# Patient Record
Sex: FEMALE | Race: BLACK OR AFRICAN AMERICAN | Employment: FULL TIME | URBAN - METROPOLITAN AREA
[De-identification: names, ages, dates, MRNs, and addresses within clinical notes are randomized per-mention and may not be internally consistent; named-entity substitution may affect disease eponyms.]

---

## 2018-01-22 ENCOUNTER — APPOINTMENT (OUTPATIENT)
Dept: GENERAL RADIOLOGY | Age: 38
End: 2018-01-22
Attending: EMERGENCY MEDICINE
Payer: MEDICARE

## 2018-01-22 ENCOUNTER — HOSPITAL ENCOUNTER (EMERGENCY)
Age: 38
Discharge: NURSING, REHAB OR LONG-TERM CARE | End: 2018-01-22
Attending: EMERGENCY MEDICINE
Payer: MEDICARE

## 2018-01-22 VITALS
DIASTOLIC BLOOD PRESSURE: 85 MMHG | TEMPERATURE: 98.9 F | RESPIRATION RATE: 16 BRPM | SYSTOLIC BLOOD PRESSURE: 130 MMHG | HEIGHT: 64 IN | OXYGEN SATURATION: 99 % | BODY MASS INDEX: 22.2 KG/M2 | WEIGHT: 130 LBS | HEART RATE: 85 BPM

## 2018-01-22 DIAGNOSIS — R07.9 CHEST PAIN, UNSPECIFIED TYPE: Primary | ICD-10-CM

## 2018-01-22 LAB
ALBUMIN SERPL-MCNC: 3.4 G/DL (ref 3.4–5)
ALBUMIN/GLOB SERPL: 0.6 {RATIO} (ref 0.8–1.7)
ALP SERPL-CCNC: 76 U/L (ref 45–117)
ALT SERPL-CCNC: 10 U/L (ref 13–56)
ANION GAP SERPL CALC-SCNC: 5 MMOL/L (ref 3–18)
AST SERPL-CCNC: 11 U/L (ref 15–37)
BASOPHILS # BLD: 0 K/UL (ref 0–0.06)
BASOPHILS NFR BLD: 0 % (ref 0–2)
BILIRUB SERPL-MCNC: 0.4 MG/DL (ref 0.2–1)
BUN SERPL-MCNC: 11 MG/DL (ref 7–18)
BUN/CREAT SERPL: 13 (ref 12–20)
CALCIUM SERPL-MCNC: 8.9 MG/DL (ref 8.5–10.1)
CHLORIDE SERPL-SCNC: 105 MMOL/L (ref 100–108)
CK MB CFR SERPL CALC: NORMAL % (ref 0–4)
CK MB SERPL-MCNC: <1 NG/ML (ref 5–25)
CK SERPL-CCNC: 46 U/L (ref 26–192)
CO2 SERPL-SCNC: 30 MMOL/L (ref 21–32)
CREAT SERPL-MCNC: 0.82 MG/DL (ref 0.6–1.3)
D DIMER PPP FEU-MCNC: 0.42 UG/ML(FEU)
DIFFERENTIAL METHOD BLD: ABNORMAL
EOSINOPHIL # BLD: 0.1 K/UL (ref 0–0.4)
EOSINOPHIL NFR BLD: 1 % (ref 0–5)
ERYTHROCYTE [DISTWIDTH] IN BLOOD BY AUTOMATED COUNT: 12.4 % (ref 11.6–14.5)
GLOBULIN SER CALC-MCNC: 6 G/DL (ref 2–4)
GLUCOSE SERPL-MCNC: 84 MG/DL (ref 74–99)
HCT VFR BLD AUTO: 35 % (ref 35–45)
HGB BLD-MCNC: 12.1 G/DL (ref 12–16)
LYMPHOCYTES # BLD: 0.8 K/UL (ref 0.9–3.6)
LYMPHOCYTES NFR BLD: 15 % (ref 21–52)
MCH RBC QN AUTO: 29.2 PG (ref 24–34)
MCHC RBC AUTO-ENTMCNC: 34.6 G/DL (ref 31–37)
MCV RBC AUTO: 84.5 FL (ref 74–97)
MONOCYTES # BLD: 0.3 K/UL (ref 0.05–1.2)
MONOCYTES NFR BLD: 5 % (ref 3–10)
NEUTS SEG # BLD: 4 K/UL (ref 1.8–8)
NEUTS SEG NFR BLD: 79 % (ref 40–73)
PLATELET # BLD AUTO: 166 K/UL (ref 135–420)
PMV BLD AUTO: 9.4 FL (ref 9.2–11.8)
POTASSIUM SERPL-SCNC: 3.7 MMOL/L (ref 3.5–5.5)
PROT SERPL-MCNC: 9.4 G/DL (ref 6.4–8.2)
RBC # BLD AUTO: 4.14 M/UL (ref 4.2–5.3)
SODIUM SERPL-SCNC: 140 MMOL/L (ref 136–145)
TROPONIN I BLD-MCNC: <0.04 NG/ML (ref 0–0.08)
TROPONIN I SERPL-MCNC: <0.02 NG/ML (ref 0–0.04)
WBC # BLD AUTO: 5.1 K/UL (ref 4.6–13.2)

## 2018-01-22 PROCEDURE — 71045 X-RAY EXAM CHEST 1 VIEW: CPT

## 2018-01-22 PROCEDURE — 99285 EMERGENCY DEPT VISIT HI MDM: CPT

## 2018-01-22 PROCEDURE — 96374 THER/PROPH/DIAG INJ IV PUSH: CPT

## 2018-01-22 PROCEDURE — 93005 ELECTROCARDIOGRAM TRACING: CPT

## 2018-01-22 PROCEDURE — 84484 ASSAY OF TROPONIN QUANT: CPT | Performed by: EMERGENCY MEDICINE

## 2018-01-22 PROCEDURE — 74011250636 HC RX REV CODE- 250/636: Performed by: EMERGENCY MEDICINE

## 2018-01-22 PROCEDURE — 74011000250 HC RX REV CODE- 250: Performed by: EMERGENCY MEDICINE

## 2018-01-22 PROCEDURE — 85025 COMPLETE CBC W/AUTO DIFF WBC: CPT | Performed by: EMERGENCY MEDICINE

## 2018-01-22 PROCEDURE — 74011250637 HC RX REV CODE- 250/637: Performed by: EMERGENCY MEDICINE

## 2018-01-22 PROCEDURE — 80053 COMPREHEN METABOLIC PANEL: CPT | Performed by: EMERGENCY MEDICINE

## 2018-01-22 PROCEDURE — 82550 ASSAY OF CK (CPK): CPT | Performed by: EMERGENCY MEDICINE

## 2018-01-22 PROCEDURE — 85379 FIBRIN DEGRADATION QUANT: CPT | Performed by: EMERGENCY MEDICINE

## 2018-01-22 RX ORDER — KETOROLAC TROMETHAMINE 30 MG/ML
30 INJECTION, SOLUTION INTRAMUSCULAR; INTRAVENOUS
Status: COMPLETED | OUTPATIENT
Start: 2018-01-22 | End: 2018-01-22

## 2018-01-22 RX ADMIN — KETOROLAC TROMETHAMINE 30 MG: 30 INJECTION, SOLUTION INTRAMUSCULAR at 18:16

## 2018-01-22 RX ADMIN — LIDOCAINE HYDROCHLORIDE 50 ML: 20 SOLUTION ORAL; TOPICAL at 18:16

## 2018-01-22 NOTE — ED PROVIDER NOTES
EMERGENCY DEPARTMENT HISTORY AND PHYSICAL EXAM    6:12 PM      Date: 1/22/2018  Patient Name: Rhonda Wynn    History of Presenting Illness     Chief Complaint   Patient presents with    Chest Pain    Shortness of Breath         History Provided By: Patient    Chief Complaint: Chest pain and shortness of breath  Duration: 9 Hours  Timing:  Constant  Location: Mid chest  Severity: 8 out of 10  Associated Symptoms: SOB and nausea. Patient denies cough, fever, chills, hx of blood clot in legs, hx of blood clot in lungs, chest pain upon palpation, and any other associated symptoms or complaints. Additional History (Context): Rhonda Wynn is a 40 y.o. female with a history of HTN, non-compliance with medication regimen, psoriasis, and bronchitis who presents with c/o mid chest pain onset 9 hours ago. Patient describes her pain as constant, located in the mid chest, and is rated 8/10 in severity. Denies any current shortness of breath. She reports that she woke up with this chest pain. She denies exacerbation of chest pain with palpation. She states that this has never happened to her before. Per EMS, patient was given 324 aspirin and 1 nitro PTA with slight improvement of pain. Patient states that she has a history of HIV and does not know her last count. Patient denies cough, fever, chills, hx of blood clot in legs, hx of blood clot in lungs, chest pain upon palpation, exacerbation of chest pain with deep breaths, and any other associated symptoms or complaints. PCP: Babatunde Luna MD    Current Outpatient Prescriptions   Medication Sig Dispense Refill    NIFEDIPINE PO Take  by mouth.  HYDROcodone-acetaminophen (NORCO) 5-325 mg per tablet Take 1 Tab by mouth every four (4) hours as needed for Pain. 12 Tab 0    cyclobenzaprine (FLEXERIL) 10 mg tablet Take 1 tablet by mouth three (3) times daily as needed for Muscle Spasm(s).  20 tablet 0       Past History     Past Medical History:  Past Medical History:   Diagnosis Date    Bronchitis     Hypertension     Non compliance w medication regimen     Psoriasis        Past Surgical History:  Past Surgical History:   Procedure Laterality Date    HX  SECTION      HX HYSTERECTOMY         Family History:  No family history on file. Social History:  Social History   Substance Use Topics    Smoking status: Current Every Day Smoker     Packs/day: 0.25    Smokeless tobacco: Not on file    Alcohol use Yes      Comment: rare       Allergies:  No Known Allergies      Review of Systems       Review of Systems   Constitutional: Negative for chills and fever. HENT: Negative for rhinorrhea. Respiratory: Positive for shortness of breath. Cardiovascular: Positive for chest pain. Gastrointestinal: Positive for nausea. Negative for abdominal pain and vomiting. Endocrine: Negative for polyuria. Genitourinary: Negative for dysuria. Musculoskeletal: Negative for back pain. Skin: Negative for rash. Neurological: Negative for headaches. All other systems reviewed and are negative. Physical Exam     Patient Vitals for the past 12 hrs:   Temp Pulse Resp BP SpO2   18 1739 99.4 °F (37.4 °C) 88 16 (!) 125/92 100 %         Physical Exam   Constitutional: She is oriented to person, place, and time. She appears well-developed and well-nourished. Speaking in full sentences   HENT:   Head: Normocephalic and atraumatic. Eyes: Conjunctivae are normal. Pupils are equal, round, and reactive to light. Neck: Normal range of motion. Neck supple. Cardiovascular: Normal rate and regular rhythm. Pulmonary/Chest: Effort normal and breath sounds normal. No respiratory distress. She has no wheezes. She exhibits no tenderness. No chest wall tenderness. Abdominal: Soft. Bowel sounds are normal. She exhibits no distension. There is no tenderness. There is no rebound and no guarding. Musculoskeletal: Normal range of motion.    Neurological: She is alert and oriented to person, place, and time. Left arm contracture at baseline. Skin: Skin is warm and dry. Psychiatric: She has a normal mood and affect. Thought content normal.   Nursing note and vitals reviewed. Diagnostic Study Results     Labs -  Recent Results (from the past 12 hour(s))   EKG, 12 LEAD, INITIAL    Collection Time: 01/22/18  5:17 PM   Result Value Ref Range    Ventricular Rate 95 BPM    Atrial Rate 95 BPM    P-R Interval 138 ms    QRS Duration 70 ms    Q-T Interval 348 ms    QTC Calculation (Bezet) 437 ms    Calculated P Axis 17 degrees    Calculated R Axis 38 degrees    Calculated T Axis 39 degrees    Diagnosis       Normal sinus rhythm  Normal ECG  When compared with ECG of 29-OCT-2014 12:12,  No significant change was found     CBC WITH AUTOMATED DIFF    Collection Time: 01/22/18  6:05 PM   Result Value Ref Range    WBC 5.1 4.6 - 13.2 K/uL    RBC 4.14 (L) 4.20 - 5.30 M/uL    HGB 12.1 12.0 - 16.0 g/dL    HCT 35.0 35.0 - 45.0 %    MCV 84.5 74.0 - 97.0 FL    MCH 29.2 24.0 - 34.0 PG    MCHC 34.6 31.0 - 37.0 g/dL    RDW 12.4 11.6 - 14.5 %    PLATELET 014 369 - 212 K/uL    MPV 9.4 9.2 - 11.8 FL    NEUTROPHILS 79 (H) 40 - 73 %    LYMPHOCYTES 15 (L) 21 - 52 %    MONOCYTES 5 3 - 10 %    EOSINOPHILS 1 0 - 5 %    BASOPHILS 0 0 - 2 %    ABS. NEUTROPHILS 4.0 1.8 - 8.0 K/UL    ABS. LYMPHOCYTES 0.8 (L) 0.9 - 3.6 K/UL    ABS. MONOCYTES 0.3 0.05 - 1.2 K/UL    ABS. EOSINOPHILS 0.1 0.0 - 0.4 K/UL    ABS.  BASOPHILS 0.0 0.0 - 0.06 K/UL    DF AUTOMATED     METABOLIC PANEL, COMPREHENSIVE    Collection Time: 01/22/18  6:05 PM   Result Value Ref Range    Sodium 140 136 - 145 mmol/L    Potassium 3.7 3.5 - 5.5 mmol/L    Chloride 105 100 - 108 mmol/L    CO2 30 21 - 32 mmol/L    Anion gap 5 3.0 - 18 mmol/L    Glucose 84 74 - 99 mg/dL    BUN 11 7.0 - 18 MG/DL    Creatinine 0.82 0.6 - 1.3 MG/DL    BUN/Creatinine ratio 13 12 - 20      GFR est AA >60 >60 ml/min/1.73m2    GFR est non-AA >60 >60 ml/min/1.73m2 Calcium 8.9 8.5 - 10.1 MG/DL    Bilirubin, total 0.4 0.2 - 1.0 MG/DL    ALT (SGPT) 10 (L) 13 - 56 U/L    AST (SGOT) 11 (L) 15 - 37 U/L    Alk. phosphatase 76 45 - 117 U/L    Protein, total 9.4 (H) 6.4 - 8.2 g/dL    Albumin 3.4 3.4 - 5.0 g/dL    Globulin 6.0 (H) 2.0 - 4.0 g/dL    A-G Ratio 0.6 (L) 0.8 - 1.7     CARDIAC PANEL,(CK, CKMB & TROPONIN)    Collection Time: 01/22/18  6:05 PM   Result Value Ref Range    CK 46 26 - 192 U/L    CK - MB <1.0 <3.6 ng/ml    CK-MB Index  0.0 - 4.0 %     CALCULATION NOT PERFORMED WHEN RESULT IS BELOW LINEAR LIMIT    Troponin-I, Qt. <0.02 0.0 - 0.045 NG/ML   D DIMER    Collection Time: 01/22/18  6:05 PM   Result Value Ref Range    D DIMER 0.42 <0.46 ug/ml(FEU)   POC TROPONIN-I    Collection Time: 01/22/18  6:43 PM   Result Value Ref Range    Troponin-I (POC) <0.04 0.00 - 0.08 ng/mL       Radiologic Studies -   No results found. Medical Decision Making   I am the first provider for this patient. I reviewed the vital signs, available nursing notes, past medical history, past surgical history, family history and social history. Vital Signs-Reviewed the patient's vital signs. Pulse Oximetry Analysis -  100% on room air (normal)    Cardiac Monitor:  Rate/rhythm:    EKG: Interpreted by the EP. Time Interpreted: 17:17   Rate: 95   Rhythm: normal sinus rhythm   Interpretation: Q wave inversion in lead 3   Comparison: Unchanged from prior    Records Reviewed: Nursing Notes (Time of Review: 6:12 PM)    Provider Notes (Medical Decision Making): Patinet with constant chest pain since this morning. Has normal troponin x2. EKG unchanged. CXR reassuring. Already given aspirin and nitro. ED Course: Progress Notes, Reevaluation, and Consults:      Diagnosis     Clinical Impression:   1.  Chest pain, unspecified type        Disposition: Discharge    Follow-up Information     Follow up With Details Comments Contact Info    Gisel Grijalva MD In 1 day  Shan Ocampo [address] [address]  [phone]             Patient's Medications   Start Taking    No medications on file   Continue Taking    CYCLOBENZAPRINE (FLEXERIL) 10 MG TABLET    Take 1 tablet by mouth three (3) times daily as needed for Muscle Spasm(s). HYDROCODONE-ACETAMINOPHEN (NORCO) 5-325 MG PER TABLET    Take 1 Tab by mouth every four (4) hours as needed for Pain. NIFEDIPINE PO    Take  by mouth. These Medications have changed    No medications on file   Stop Taking    No medications on file     _______________________________    Attestations:  [address] acting as a scribe for and in the presence of Nav Kelly MD      January 22, 2018 at [facility] NO 5 PM       Provider Attestation:      I personally performed the services described in the documentation, reviewed the documentation, as recorded by the scribe in my presence, and it accurately and completely records my words and actions.  January 22, 2018 at 6:12 PM - Nav Kelly MD    _______________________________

## 2018-01-23 LAB
ATRIAL RATE: 95 BPM
CALCULATED P AXIS, ECG09: 17 DEGREES
CALCULATED R AXIS, ECG10: 38 DEGREES
CALCULATED T AXIS, ECG11: 39 DEGREES
DIAGNOSIS, 93000: NORMAL
P-R INTERVAL, ECG05: 138 MS
Q-T INTERVAL, ECG07: 348 MS
QRS DURATION, ECG06: 70 MS
QTC CALCULATION (BEZET), ECG08: 437 MS
VENTRICULAR RATE, ECG03: 95 BPM

## 2018-01-23 NOTE — ED NOTES
[facility] made aware of patient's discharge from [facility]. Expected arrival of 30 minutes.

## 2018-01-23 NOTE — DISCHARGE INSTRUCTIONS
Your blood tests, x-ray, and EKG were reassuring. If you have any worsening symptoms, please return. Chest Pain: Care Instructions  Your Care Instructions    There are many things that can cause chest pain. Some are not serious and will get better on their own in a few days. But some kinds of chest pain need more testing and treatment. Your doctor may have recommended a follow-up visit in the next 8 to 12 hours. If you are not getting better, you may need more tests or treatment. Even though your doctor has released you, you still need to watch for any problems. The doctor carefully checked you, but sometimes problems can develop later. If you have new symptoms or if your symptoms do not get better, get medical care right away. If you have worse or different chest pain or pressure that lasts more than 5 minutes or you passed out (lost consciousness), call 911 or seek other emergency help right away. A medical visit is only one step in your treatment. Even if you feel better, you still need to do what your doctor recommends, such as going to all suggested follow-up appointments and taking medicines exactly as directed. This will help you recover and help prevent future problems. How can you care for yourself at home? · Rest until you feel better. · Take your medicine exactly as prescribed. Call your doctor if you think you are having a problem with your medicine. · Do not drive after taking a prescription pain medicine. When should you call for help? Call 911 if:  ? · You passed out (lost consciousness). ? · You have severe difficulty breathing. ? · You have symptoms of a heart attack. These may include:  ¨ Chest pain or pressure, or a strange feeling in your chest.  ¨ Sweating. ¨ Shortness of breath. ¨ Nausea or vomiting. ¨ Pain, pressure, or a strange feeling in your back, neck, jaw, or upper belly or in one or both shoulders or arms. ¨ Lightheadedness or sudden weakness.   ¨ A fast or irregular heartbeat. After you call 911, the  may tell you to chew 1 adult-strength or 2 to 4 low-dose aspirin. Wait for an ambulance. Do not try to drive yourself. ?Call your doctor today if:  ? · You have any trouble breathing. ? · Your chest pain gets worse. ? · You are dizzy or lightheaded, or you feel like you may faint. ? · You are not getting better as expected. ? · You are having new or different chest pain. Where can you learn more? Go to http://arturo-saúl.info/. Enter A120 in the search box to learn more about \"Chest Pain: Care Instructions. \"  Current as of: March 20, 2017  Content Version: 11.4  © 5001-8590 [employer]. Care instructions adapted under license by [employer] (which disclaims liability or warranty for this information). If you have questions about a medical condition or this instruction, always ask your healthcare professional. [place] [address] any warranty or liability for your use of this information.

## 2018-01-23 NOTE — ED NOTES
Patient is now leaving for [facility] via [employer]. No acute distress noted. Needs met.

## 2018-06-11 ENCOUNTER — APPOINTMENT (OUTPATIENT)
Dept: CT IMAGING | Age: 38
End: 2018-06-11
Attending: EMERGENCY MEDICINE
Payer: MEDICARE

## 2018-06-11 ENCOUNTER — HOSPITAL ENCOUNTER (EMERGENCY)
Age: 38
Discharge: HOME | End: 2018-06-11
Attending: EMERGENCY MEDICINE | Admitting: EMERGENCY MEDICINE
Payer: MEDICARE

## 2018-06-11 VITALS
OXYGEN SATURATION: 97 % | RESPIRATION RATE: 16 BRPM | SYSTOLIC BLOOD PRESSURE: 111 MMHG | HEART RATE: 98 BPM | DIASTOLIC BLOOD PRESSURE: 79 MMHG | TEMPERATURE: 98.1 F
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
## 2018-06-11 DIAGNOSIS — Z91.14 NONCOMPLIANCE WITH MEDICATION REGIMEN: ICD-10-CM

## 2018-06-11 PROCEDURE — 99284 EMERGENCY DEPT VISIT MOD MDM: CPT

## 2018-06-11 PROCEDURE — 72125 CT NECK SPINE W/O DYE: CPT

## 2018-06-11 PROCEDURE — 70450 CT HEAD/BRAIN W/O DYE: CPT

## 2018-06-11 NOTE — ED TRIAGE NOTES
Patient brought in from [facility], she is alert and orientedx4. Patient reports she fell yesterday but is not in pain and does not know why they sent her here. Per Transport patient has been refusing medications at the nursing home. Patient reported she is not eating as well.

## 2018-06-11 NOTE — ED NOTES
Patient refused multiple attempts to draw blood as requested from PCP. Doctor Irving Seals made aware. Patient remains alert and oriented. Needs met. Will return to [facility].

## 2018-06-11 NOTE — ED TRIAGE NOTES
Per EMS-\"Patient has been falling today. Patient has a history of HIV and CVA. She has a history of left side deficit and slurred speech. \"

## 2018-06-11 NOTE — ED PROVIDER NOTES
EMERGENCY DEPARTMENT HISTORY AND PHYSICAL EXAM    12:04 PM      Date: 6/11/2018  Patient Name: Smiley Alfaro    History of Presenting Illness     Chief Complaint   Patient presents with    Fall         History Provided By: Patient and EMS    Chief Complaint: Fall  Duration:  Last night  Timing:  Acute  Location: Generalized  Quality: Not described  Severity: N/A  Modifying Factors: No modifying or aggravating factors were reported. Associated Symptoms: denies any other associated signs or symptoms      Additional History (Context): Smiley Alfaro is a 40 y.o. female with PMHx of CVA, HTN and HIV who presents to the ED with c/o acute episode of fall last night. Patient has been a resident at [facility] for the past 2 months after history of CVA last year at [facility]. Patient reports she sustained a mechanical fall out of bed while attempting to reach for her call bell, notes hitting her head, denies LOC. States she was told by staff that she sustained another fall this morning, does not recall incident. Patient admits she had a 7/10 HA earlier in the morning for which she was given Tylenol at [facility], admits her HA is currently resolved. Denies chest pain, abdominal pain, nausea, vomiting, back pain or SI. States she has been NPO, \"I'm just not hungry or thirsty. \" Per EMS, patient was refusing her daily medications, participating in ADL's and was sent to the ED for a \"full workup. \" No modifying or aggravating factors were reported. No other symptoms or concerns were expressed. PCP: All Small MD    Current Outpatient Prescriptions   Medication Sig Dispense Refill    NIFEDIPINE PO Take  by mouth.  HYDROcodone-acetaminophen (NORCO) 5-325 mg per tablet Take 1 Tab by mouth every four (4) hours as needed for Pain. 12 Tab 0    cyclobenzaprine (FLEXERIL) 10 mg tablet Take 1 tablet by mouth three (3) times daily as needed for Muscle Spasm(s).  20 tablet 0       Past History     Past Medical History:  Past Medical History:   Diagnosis Date    Bronchitis     Hypertension     Infectious disease     Non compliance w medication regimen     Psoriasis        Past Surgical History:  Past Surgical History:   Procedure Laterality Date    HX  SECTION      HX HYSTERECTOMY         Family History:  History reviewed. No pertinent family history. Social History:  Social History   Substance Use Topics    Smoking status: Current Every Day Smoker     Packs/day: 0.25    Smokeless tobacco: None    Alcohol use Yes      Comment: rare       Allergies:  No Known Allergies      Review of Systems       Review of Systems   Constitutional: Negative for chills. HENT: Negative for congestion and sore throat. Respiratory: Negative for cough and shortness of breath. Cardiovascular: Negative for chest pain. Gastrointestinal: Negative for abdominal pain, diarrhea, nausea and vomiting. Genitourinary: Negative for dysuria. Musculoskeletal: Negative for back pain. Skin: Negative for rash. Neurological: Negative for dizziness and headaches. All other systems reviewed and are negative. Physical Exam     Visit Vitals    /81    Pulse 98    Temp 98.1 °F (36.7 °C)    Resp 18         Physical Exam  General Exam: Chronically ill appearing. No acute distress. Eye Exam: Lids and conjunctiva are normal  ENT Exam: The general head and facial exam is normal.  The neck is supple without meningeal signs. No significant adenopathy. Dry oral mucosa. Pulmonary Exam: No respiratory distress. The respiratory rate is normal.  No stridor. The breath sounds are equal bilaterally. There are no wheezes, rales, or rhonchi noted. Cardiac Exam: The cardiac rate and rhythm are normal.  No significant murmurs, rubs, or gallops. The peripheral pulses are normal.  Abdominal Exam: Abdomen is soft and non-distended. No pulsatile masses. There is no local tenderness. There is no rebound or guarding noted.   Skin and Soft Tissue: The skin is warm and dry, without significant abnormality. Good color. Musculoskeletal Exam: There is no clubbing or cyanosis. There is no peripheral edema. The musculoskeletal exam of the lower extremities is normal without significant local tenderness or spasm. Neurologic: Alert and oriented to baseline. LUE in contracture (chronic). No focal deficits to BLE or RUE. Psychiatric: Oriented to baseline, normal interactions. Refusing blood draw, denies SI or HI. Diagnostic Study Results     Radiologic Studies -   CT HEAD WO CONT   Final Result      CT SPINE CERV WO CONT   Final Result      CT Head:  1. No acute findings. 2.  Chronic right anterior lenticulostriate infarct. CT C-Spine:  1. No acute findings. 2.  Chronic right anterior lenticulostriate infarct. Medical Decision Making   I am the first provider for this patient. I reviewed the vital signs, available nursing notes, past medical history, past surgical history, family history and social history. Vital Signs-Reviewed the patient's vital signs. Cardiac Monitor:  Rate: 98  Rhythm:  Normal Sinus Rhythm     Records Reviewed: Nursing Notes and Old Medical Records (Time of Review: 12:04 PM)    ED Course: Progress Notes, Reevaluation, and Consults:  2:03 PM: Per radiologist, patient CT Head and CT C-spine negative. 2:13 PM; Patient again offered testing of blood work by myself and her nurse. Patient continues to state she does not want this. Patient aware there could be abnormalities that we could help treat today. Aware if she changes her mind, she can return to the ED for blood testing. Provider Notes (Medical Decision Making): Pt with h/o HIV and CVA residing in NH. At baseline per transport service and NH. Pt answering questions appropriately and able to state when she had her stroke and where she was treated. H/o med non compliance. Agrees to CT head and neck to eval for injury after falls.  Declining blood draw for labs. Not able to force pt to have blood work as she demonstrates reasonable judgement and appropriate answers to questions and aware of risks of refusing blood work. Pt appears at baseline and to return ton NH. Diagnosis     Clinical Impression:   1. Fall at nursing home, initial encounter    2. Noncompliance with medication regimen        Disposition: Discharge. Follow-up Information     Follow up With Details Comments Contact Info    Abad Majano MD In 2 days For Emergency Department Follow-Up ePr [address] [facility]      [facility] EMERGENCY DEPT  As needed, If symptoms worsen or if you change your mind about having blood work done. [address]  [phone]           Patient's Medications   Start Taking    No medications on file   Continue Taking    CYCLOBENZAPRINE (FLEXERIL) 10 MG TABLET    Take 1 tablet by mouth three (3) times daily as needed for Muscle Spasm(s). HYDROCODONE-ACETAMINOPHEN (NORCO) 5-325 MG PER TABLET    Take 1 Tab by mouth every four (4) hours as needed for Pain. NIFEDIPINE PO    Take  by mouth. These Medications have changed    No medications on file   Stop Taking    No medications on file     _______________________________    Attestations:  [address] acting as a scribe for and in the presence of Aminta Childress MD      June 11, 2018 at 12:04 PM       Provider Attestation:      I personally performed the services described in the documentation, reviewed the documentation, as recorded by the scribe in my presence, and it accurately and completely records my words and actions.  June 11, 2018 at 12:04 PM - Aminta Childress MD    _______________________________

## 2018-07-25 ENCOUNTER — APPOINTMENT (OUTPATIENT)
Dept: ULTRASOUND IMAGING | Age: 38
End: 2018-07-25
Attending: EMERGENCY MEDICINE
Payer: OTHER MISCELLANEOUS

## 2018-07-25 ENCOUNTER — HOSPITAL ENCOUNTER (EMERGENCY)
Age: 38
Discharge: OTHER INSTITUTION | End: 2018-07-25
Attending: EMERGENCY MEDICINE
Payer: OTHER MISCELLANEOUS

## 2018-07-25 VITALS
BODY MASS INDEX: 14.93 KG/M2 | SYSTOLIC BLOOD PRESSURE: 125 MMHG | OXYGEN SATURATION: 100 % | WEIGHT: 87 LBS | TEMPERATURE: 98.4 F | DIASTOLIC BLOOD PRESSURE: 86 MMHG | HEART RATE: 104 BPM | RESPIRATION RATE: 18 BRPM

## 2018-07-25 DIAGNOSIS — E86.0 DEHYDRATION: Primary | ICD-10-CM

## 2018-07-25 DIAGNOSIS — N30.00 ACUTE CYSTITIS WITHOUT HEMATURIA: ICD-10-CM

## 2018-07-25 LAB
ALBUMIN SERPL-MCNC: 3.1 G/DL (ref 3.4–5)
ALBUMIN/GLOB SERPL: 0.5 {RATIO} (ref 0.8–1.7)
ALP SERPL-CCNC: 93 U/L (ref 45–117)
ALT SERPL-CCNC: 66 U/L (ref 13–56)
AMPHET UR QL SCN: NEGATIVE
ANION GAP SERPL CALC-SCNC: 9 MMOL/L (ref 3–18)
APPEARANCE UR: ABNORMAL
AST SERPL-CCNC: 80 U/L (ref 15–37)
ATRIAL RATE: 117 BPM
BACTERIA URNS QL MICRO: ABNORMAL /HPF
BARBITURATES UR QL SCN: NEGATIVE
BASOPHILS # BLD: 0 K/UL (ref 0–0.1)
BASOPHILS NFR BLD: 0 % (ref 0–2)
BENZODIAZ UR QL: NEGATIVE
BILIRUB SERPL-MCNC: 3 MG/DL (ref 0.2–1)
BILIRUB UR QL: ABNORMAL
BUN SERPL-MCNC: 73 MG/DL (ref 7–18)
BUN/CREAT SERPL: 50 (ref 12–20)
CALCIUM SERPL-MCNC: 9.2 MG/DL (ref 8.5–10.1)
CALCULATED P AXIS, ECG09: 53 DEGREES
CALCULATED R AXIS, ECG10: 65 DEGREES
CALCULATED T AXIS, ECG11: -28 DEGREES
CANNABINOIDS UR QL SCN: NEGATIVE
CHLORIDE SERPL-SCNC: 115 MMOL/L (ref 100–108)
CK MB CFR SERPL CALC: 1 % (ref 0–4)
CK MB SERPL-MCNC: 1.6 NG/ML (ref 5–25)
CK SERPL-CCNC: 162 U/L (ref 26–192)
CO2 SERPL-SCNC: 27 MMOL/L (ref 21–32)
COCAINE UR QL SCN: NEGATIVE
COLOR UR: ABNORMAL
CREAT SERPL-MCNC: 1.46 MG/DL (ref 0.6–1.3)
DIAGNOSIS, 93000: NORMAL
DIFFERENTIAL METHOD BLD: ABNORMAL
EOSINOPHIL # BLD: 0 K/UL (ref 0–0.4)
EOSINOPHIL NFR BLD: 0 % (ref 0–5)
EPITH CASTS URNS QL MICRO: ABNORMAL /LPF (ref 0–5)
ERYTHROCYTE [DISTWIDTH] IN BLOOD BY AUTOMATED COUNT: 15.2 % (ref 11.6–14.5)
GLOBULIN SER CALC-MCNC: 6.2 G/DL (ref 2–4)
GLUCOSE SERPL-MCNC: 93 MG/DL (ref 74–99)
GLUCOSE UR STRIP.AUTO-MCNC: NEGATIVE MG/DL
GRAN CASTS URNS QL MICRO: ABNORMAL /LPF
HCT VFR BLD AUTO: 31.9 % (ref 35–45)
HDSCOM,HDSCOM: NORMAL
HGB BLD-MCNC: 10.1 G/DL (ref 12–16)
HGB UR QL STRIP: ABNORMAL
HYALINE CASTS URNS QL MICRO: ABNORMAL /LPF (ref 0–2)
KETONES UR QL STRIP.AUTO: ABNORMAL MG/DL
LACTATE BLD-SCNC: 1.4 MMOL/L (ref 0.4–2)
LEUKOCYTE ESTERASE UR QL STRIP.AUTO: ABNORMAL
LYMPHOCYTES # BLD: 0.7 K/UL (ref 0.9–3.6)
LYMPHOCYTES NFR BLD: 11 % (ref 21–52)
MAGNESIUM SERPL-MCNC: 2.8 MG/DL (ref 1.6–2.6)
MCH RBC QN AUTO: 28.9 PG (ref 24–34)
MCHC RBC AUTO-ENTMCNC: 31.7 G/DL (ref 31–37)
MCV RBC AUTO: 91.1 FL (ref 74–97)
METHADONE UR QL: NEGATIVE
MONOCYTES # BLD: 0.5 K/UL (ref 0.05–1.2)
MONOCYTES NFR BLD: 8 % (ref 3–10)
MUCOUS THREADS URNS QL MICRO: ABNORMAL /LPF
NEUTS SEG # BLD: 5.3 K/UL (ref 1.8–8)
NEUTS SEG NFR BLD: 81 % (ref 40–73)
NITRITE UR QL STRIP.AUTO: POSITIVE
OPIATES UR QL: NEGATIVE
P-R INTERVAL, ECG05: 124 MS
PCP UR QL: NEGATIVE
PH UR STRIP: 5 [PH] (ref 5–8)
PLATELET # BLD AUTO: 134 K/UL (ref 135–420)
PMV BLD AUTO: 10.3 FL (ref 9.2–11.8)
POTASSIUM SERPL-SCNC: 3.8 MMOL/L (ref 3.5–5.5)
PROT SERPL-MCNC: 9.3 G/DL (ref 6.4–8.2)
PROT UR STRIP-MCNC: 100 MG/DL
Q-T INTERVAL, ECG07: 312 MS
QRS DURATION, ECG06: 78 MS
QTC CALCULATION (BEZET), ECG08: 435 MS
RBC # BLD AUTO: 3.5 M/UL (ref 4.2–5.3)
RBC #/AREA URNS HPF: ABNORMAL /HPF (ref 0–5)
SODIUM SERPL-SCNC: 151 MMOL/L (ref 136–145)
SP GR UR REFRACTOMETRY: 1.02 (ref 1–1.03)
TROPONIN I SERPL-MCNC: 0.04 NG/ML (ref 0–0.04)
UROBILINOGEN UR QL STRIP.AUTO: 2 EU/DL (ref 0.2–1)
VENTRICULAR RATE, ECG03: 117 BPM
WBC # BLD AUTO: 6.5 K/UL (ref 4.6–13.2)
WBC URNS QL MICRO: ABNORMAL /HPF (ref 0–4)

## 2018-07-25 PROCEDURE — 74011250636 HC RX REV CODE- 250/636: Performed by: EMERGENCY MEDICINE

## 2018-07-25 PROCEDURE — 99285 EMERGENCY DEPT VISIT HI MDM: CPT

## 2018-07-25 PROCEDURE — 80053 COMPREHEN METABOLIC PANEL: CPT | Performed by: EMERGENCY MEDICINE

## 2018-07-25 PROCEDURE — 76705 ECHO EXAM OF ABDOMEN: CPT

## 2018-07-25 PROCEDURE — 85025 COMPLETE CBC W/AUTO DIFF WBC: CPT | Performed by: EMERGENCY MEDICINE

## 2018-07-25 PROCEDURE — 96361 HYDRATE IV INFUSION ADD-ON: CPT

## 2018-07-25 PROCEDURE — 81001 URINALYSIS AUTO W/SCOPE: CPT | Performed by: EMERGENCY MEDICINE

## 2018-07-25 PROCEDURE — 96365 THER/PROPH/DIAG IV INF INIT: CPT

## 2018-07-25 PROCEDURE — 83735 ASSAY OF MAGNESIUM: CPT | Performed by: EMERGENCY MEDICINE

## 2018-07-25 PROCEDURE — 87040 BLOOD CULTURE FOR BACTERIA: CPT | Performed by: EMERGENCY MEDICINE

## 2018-07-25 PROCEDURE — 82553 CREATINE MB FRACTION: CPT | Performed by: EMERGENCY MEDICINE

## 2018-07-25 PROCEDURE — 87086 URINE CULTURE/COLONY COUNT: CPT | Performed by: EMERGENCY MEDICINE

## 2018-07-25 PROCEDURE — 80307 DRUG TEST PRSMV CHEM ANLYZR: CPT | Performed by: EMERGENCY MEDICINE

## 2018-07-25 PROCEDURE — 83605 ASSAY OF LACTIC ACID: CPT

## 2018-07-25 PROCEDURE — 93005 ELECTROCARDIOGRAM TRACING: CPT

## 2018-07-25 RX ORDER — NYSTATIN 100000 U/G
CREAM TOPICAL
COMMUNITY

## 2018-07-25 RX ORDER — LEVOFLOXACIN 5 MG/ML
500 INJECTION, SOLUTION INTRAVENOUS
Status: COMPLETED | OUTPATIENT
Start: 2018-07-25 | End: 2018-07-25

## 2018-07-25 RX ORDER — LOSARTAN POTASSIUM 50 MG/1
50 TABLET ORAL DAILY
COMMUNITY

## 2018-07-25 RX ORDER — LEVOFLOXACIN 750 MG/1
750 TABLET ORAL DAILY
Qty: 5 TAB | Refills: 0 | Status: SHIPPED | OUTPATIENT
Start: 2018-07-25 | End: 2018-07-30

## 2018-07-25 RX ORDER — ACETAMINOPHEN 325 MG/1
650 TABLET ORAL
COMMUNITY

## 2018-07-25 RX ADMIN — LEVOFLOXACIN 500 MG: 5 INJECTION, SOLUTION INTRAVENOUS at 07:48

## 2018-07-25 RX ADMIN — SODIUM CHLORIDE 1000 ML: 900 INJECTION, SOLUTION INTRAVENOUS at 02:09

## 2018-07-25 RX ADMIN — SODIUM CHLORIDE 500 ML: 900 INJECTION, SOLUTION INTRAVENOUS at 10:58

## 2018-07-25 NOTE — ED NOTES
Assumed care of pt. VSS. Pt does not have iv access at this time. Dr Sam Sheldon aware.   Report from [address] [facility]

## 2018-07-25 NOTE — PROGRESS NOTES
attempted to complete the initial Spiritual Assessment of the patient in bed 8 of the emergency room and offer Pastoral Care support but found patient to be non-responsive at this time. . Patient does not have any Zoroastrian/cultural needs that will affect patients preferences in health care. Chaplains will continue to follow and will provide pastoral care on an as needed/requested basis.     [facility]  [phone]

## 2018-07-25 NOTE — ED NOTES
Pt able to communicate using R hand. Pt will squeeze hand to answer yes/no questions. Follows commands.  Unable to utilize left side

## 2018-07-25 NOTE — ED NOTES
6:10AM Signed over from Dr. Hiral Paulson to follow up on US result. Patient already admitted but they want the [address] result before assigning a bed assignment. 9:50 AM US has been resulted. There is sludge but no obvious sign of inflammation in gallbladder. Will discuss with hospitalist as they are awaiting that result before admitting/accepting the patient    10:32 AM I had called Dr. Avery Camara to accept the patient as US study was back w/o any pathology. According to Dr. Hiral Paulson, pt was admitted without bed placement as this test was pending. According to Dr. Avery Camara, there was  no endorsement or discussion regarding admitting or accepting the pt on his side. Will call hospice to get further direction for further care as pt is under their care. 10:52 AM Discussed with hospice, as per nurse Anthony, they are comfortable managing patient out patient in nursing home. No further intervention requested. Tachycardia has improved in ED. Have been given fluids and IV abx and rx for home for UTI. Will discharge. 11:56 AM Consult: I discussed care with Dr. Fuentes Douglas (Hospice). It was a standard discussion including patient history, chief complaint, available diagnostic results, and predicted treatment course. He states that the patient is comfort measure only and he is very comfortable taking care of the pt and not doing any aggressive measures. Made aware of abnormal labs. Nicole Peterson acting as a scribe for and in the presence of Pranay Duncan MD      July 25, 2018 at 10:04 AM       Provider Attestation:      I personally performed the services described in the documentation, reviewed the documentation, as recorded by the scribe in my presence, and it accurately and completely records my words and actions.  July 25, 2018 at 10:04 AM - Pranay Duncan MD

## 2018-07-25 NOTE — ED NOTES
Rodrigo London RN from [facility] called back stating she is unable to get in contact with any of pt next of kin. Pt now needs to be treated as a \"regular patient and will be removed\" from their service on hospice. MD aware.      Next of kin they have listed:  Whitley Arnold- Mother in law- [phone]  Ovi Pandey- [phone]

## 2018-07-25 NOTE — ED NOTES
Per Dr. Sharon Anand and Hermann Pena MD-no indication for pt admit. [facility] and [address] [place] [address] notified.  Pt given d/c packet and px for Levaquin (UTI)

## 2018-07-25 NOTE — DISCHARGE INSTRUCTIONS
Dehydration: Care Instructions  Your Care Instructions  Dehydration happens when your body loses too much fluid. This might happen when you do not drink enough water or you lose large amounts of fluids from your body because of diarrhea, vomiting, or sweating. Severe dehydration can be life-threatening. Water and minerals called electrolytes help put your body fluids back in balance. Learn the early signs of fluid loss, and drink more fluids to prevent dehydration. Follow-up care is a key part of your treatment and safety. Be sure to make and go to all appointments, and call your doctor if you are having problems. It's also a good idea to know your test results and keep a list of the medicines you take. How can you care for yourself at home? · To prevent dehydration, drink plenty of fluids, enough so that your urine is light yellow or clear like water. Choose water and other caffeine-free clear liquids until you feel better. If you have kidney, heart, or liver disease and have to limit fluids, talk with your doctor before you increase the amount of fluids you drink. · If you do not feel like eating or drinking, try taking small sips of water, sports drinks, or other rehydration drinks. · Get plenty of rest.  To prevent dehydration  · Add more fluids to your diet and daily routine, unless your doctor has told you not to. · During hot weather, drink more fluids. Drink even more fluids if you exercise a lot. Stay away from drinks with alcohol or caffeine. · Watch for the symptoms of dehydration. These include:  ¨ A dry, sticky mouth. ¨ Dark yellow urine, and not much of it. ¨ Dry and sunken eyes. ¨ Feeling very tired. · Learn what problems can lead to dehydration. These include:  ¨ Diarrhea, fever, and vomiting. ¨ Any illness with a fever, such as pneumonia or the flu. ¨ Activities that cause heavy sweating, such as endurance races and heavy outdoor work in hot or humid weather.   ¨ Alcohol or drug abuse or withdrawal.  ¨ Certain medicines, such as cold and allergy pills (antihistamines), diet pills (diuretics), and laxatives. ¨ Certain diseases, such as diabetes, cancer, and heart or kidney disease. When should you call for help? Call 911 anytime you think you may need emergency care. For example, call if:    · You passed out (lost consciousness).    Call your doctor now or seek immediate medical care if:    · You are confused and cannot think clearly.     · You are dizzy or lightheaded, or you feel like you may faint.     · You have signs of needing more fluids. You have sunken eyes and a dry mouth, and you pass only a little dark urine.     · You cannot keep fluids down.    Watch closely for changes in your health, and be sure to contact your doctor if:    · You are not making tears.     · Your skin is very dry and sags slowly back into place after you pinch it.     · Your mouth and eyes are very dry. Where can you learn more? Go to http://arturo-saúl.info/. Enter O570 in the search box to learn more about \"Dehydration: Care Instructions. \"  Current as of: November 20, 2017  Content Version: 11.7  © 9839-4803 [employer]. Care instructions adapted under license by [employer] (which disclaims liability or warranty for this information). If you have questions about a medical condition or this instruction, always ask your healthcare professional. [place] [address] any warranty or liability for your use of this information. Urinary Tract Infection in Women: Care Instructions  Your Care Instructions    A urinary tract infection, or UTI, is a general term for an infection anywhere between the kidneys and the urethra (where urine comes out). Most UTIs are bladder infections. They often cause pain or burning when you urinate. UTIs are caused by bacteria and can be cured with antibiotics.  Be sure to complete your treatment so that the infection goes away. Follow-up care is a key part of your treatment and safety. Be sure to make and go to all appointments, and call your doctor if you are having problems. It's also a good idea to know your test results and keep a list of the medicines you take. How can you care for yourself at home? · Take your antibiotics as directed. Do not stop taking them just because you feel better. You need to take the full course of antibiotics. · Drink extra water and other fluids for the next day or two. This may help wash out the bacteria that are causing the infection. (If you have kidney, heart, or liver disease and have to limit fluids, talk with your doctor before you increase your fluid intake.)  · Avoid drinks that are carbonated or have caffeine. They can irritate the bladder. · Urinate often. Try to empty your bladder each time. · To relieve pain, take a hot bath or lay a heating pad set on low over your lower belly or genital area. Never go to sleep with a heating pad in place. To prevent UTIs  · Drink plenty of water each day. This helps you urinate often, which clears bacteria from your system. (If you have kidney, heart, or liver disease and have to limit fluids, talk with your doctor before you increase your fluid intake.)  · Urinate when you need to. · Urinate right after you have sex. · Change sanitary pads often. · Avoid douches, bubble baths, feminine hygiene sprays, and other feminine hygiene products that have deodorants. · After going to the bathroom, wipe from front to back. When should you call for help? Call your doctor now or seek immediate medical care if:    · Symptoms such as fever, chills, nausea, or vomiting get worse or appear for the first time.     · You have new pain in your back just below your rib cage.  This is called flank pain.     · There is new blood or pus in your urine.     · You have any problems with your antibiotic medicine.    Watch closely for changes in your health, and be sure to contact your doctor if:    · You are not getting better after taking an antibiotic for 2 days.     · Your symptoms go away but then come back. Where can you learn more? Go to http://arturo-saúl.info/. Enter R503 in the search box to learn more about \"Urinary Tract Infection in Women: Care Instructions. \"  Current as of: May 12, 2017  Content Version: 11.7  © 6611-6311 [employer]. Care instructions adapted under license by [employer] (which disclaims liability or warranty for this information). If you have questions about a medical condition or this instruction, always ask your healthcare professional. [address] any warranty or liability for your use of this information.

## 2018-07-25 NOTE — ED TRIAGE NOTES
Pt arrived via EMS from [facility] with c/o high heart rate. Pt on hospice. Per EMS told pt needs to come to ER for heart rate greater than 130. Candance Huger

## 2018-07-25 NOTE — PROGRESS NOTES
Chart reviewed. Pt came from [facility] for Hospice care and pt bandar not be admitted to the hospital. [facility] and spoke with Norah Solorio, admissions and states that they will accept pt back. ED MD, charge nurse and RN made aware.

## 2018-07-25 NOTE — ED PROVIDER NOTES
[facility] EMERGENCY DEPT      1:49 AM    Date: 2018  Patient Name: Robert Santiago    History of Presenting Illness     Chief Complaint   Patient presents with    Rapid Heart Rate       History Provided By: Caregiver and Adali Lezama RN at [facility]    Chief Complaint: N/A  Duration:  N/A  Timing:  N/A  Location: N/A  Quality: N/A  Severity: N/A  Modifying Factors: N/A  Associated Symptoms: N/A    40 y.o. female with noted past medical history who presents to the emergency department with a limited history because the patient would not answer any questions. No other concerns or symptoms at this time. No other complaints. Nursing notes regarding the HPI and triage nursing notes were reviewed. Prior medical records were reviewed. Current Outpatient Prescriptions   Medication Sig Dispense Refill    elvitegravir-cobicistat-emtricitabine-tenofovir alafenamide (GENVOYA) tab tablet Take 1 Tab by mouth daily (with breakfast).  losartan (COZAAR) 50 mg tablet Take 50 mg by mouth daily.  nystatin (MYCOSTATIN) topical cream Apply  to affected area three (3) times daily as needed for Skin Irritation.  dimethic/zinc ox/vits A,D/aloe (ZINC OXIDE DIAPER CREAM EX) by Apply Externally route.  acetaminophen (TYLENOL) 325 mg tablet Take 650 mg by mouth every six (6) hours as needed for Pain. Past History     Past Medical History:  Past Medical History:   Diagnosis Date    Bronchitis     CVA (cerebral vascular accident) ([address]     CVA (cerebral vascular accident) ([address]     HIV (human immunodeficiency virus infection) ([address]     HIV (human immunodeficiency virus infection) ([address]     Hypertension     Infectious disease     Non compliance w medication regimen     Psoriasis        Past Surgical History:  Past Surgical History:   Procedure Laterality Date    HX  SECTION      HX HYSTERECTOMY         Family History:  History reviewed.  No pertinent family history. Social History:  Social History   Substance Use Topics    Smoking status: Current Every Day Smoker     Packs/day: 0.25    Smokeless tobacco: Never Used    Alcohol use Yes      Comment: rare       Allergies:  No Known Allergies    Patient's primary care provider (as noted in EPIC):  Germaine Schultz MD    REVIEW OF SYSTEMS:  Limited secondary to altered mental status. If ROS is provided, it came from collateral sources such as family, friends, or nursing faclity where appropriate. Visit Vitals    BP (!) 134/96    Pulse (!) 105    Temp 98.4 °F (36.9 °C)    Resp 20    Wt 39.5 kg (87 lb)    SpO2 100%    BMI 14.93 kg/m2       PHYSICAL EXAM:    CONSTITUTIONAL:  Well developed;  well nourished. Limited secondary to altered mental status. HEAD:  Normocephalic, atraumatic. EYES:  Non-icteric sclera. Normal conjunctiva. Limited secondary to altered mental status. ENTM:  Nose:  no rhinorrhea. Throat:  no erythema or exudate, mucous membranes moist.  NECK:  No JVD. Limited secondary to altered mental status. RESPIRATORY:  Chest clear, equal breath sounds, good air movement. CARDIOVASCULAR:  Regular rate and rhythm. No murmurs, rubs, or gallops. GI:  Normal bowel sounds, abdomen soft. Limited secondary to altered mental status. BACK:  Non-tender. UPPER EXT:  Normal inspection. LOWER EXT:  No edema, no calf tenderness. Distal pulses intact. NEURO:  Limited secondary to altered mental status. SKIN:  No rashes;  Normal for age. PSYCH:  Limited secondary to altered mental status. DIFFERENTIAL DIAGNOSES/ MEDICAL DECISION MAKING:  Hypoglycemia, acute alcohol, drug, or multipharmacy intoxication, sepsis from numerous possible sources including urosepsis, pneumonia, meningitis, significant CVA, TIA, intracerebral hemorrhage, subdural hemorrhage, seizure, significant trauma, electrolyte or hormonal imbalance, other etiologies, versus a combination of the above.     Diagnostic Study Results     Abnormal lab results from this emergency department encounter:  Labs Reviewed   CBC WITH AUTOMATED DIFF - Abnormal; Notable for the following:        Result Value    RBC 3.50 (*)     HGB 10.1 (*)     HCT 31.9 (*)     RDW 15.2 (*)     PLATELET 795 (*)     NEUTROPHILS 81 (*)     LYMPHOCYTES 11 (*)     ABS.  LYMPHOCYTES 0.7 (*)     All other components within normal limits   MAGNESIUM - Abnormal; Notable for the following:     Magnesium 2.8 (*)     All other components within normal limits   METABOLIC PANEL, COMPREHENSIVE - Abnormal; Notable for the following:     Sodium 151 (*)     Chloride 115 (*)     BUN 73 (*)     Creatinine 1.46 (*)     BUN/Creatinine ratio 50 (*)     GFR est AA 49 (*)     GFR est non-AA 41 (*)     Bilirubin, total 3.0 (*)     ALT (SGPT) 66 (*)     AST (SGOT) 80 (*)     Protein, total 9.3 (*)     Albumin 3.1 (*)     Globulin 6.2 (*)     A-G Ratio 0.5 (*)     All other components within normal limits   URINALYSIS W/ RFLX MICROSCOPIC - Abnormal; Notable for the following:     Protein 100 (*)     Ketone TRACE (*)     Bilirubin MODERATE (*)     Blood SMALL (*)     Urobilinogen 2.0 (*)     Nitrites POSITIVE (*)     Leukocyte Esterase SMALL (*)     All other components within normal limits   URINE MICROSCOPIC ONLY - Abnormal; Notable for the following:     Bacteria 4+ (*)     Mucus 1+ (*)     All other components within normal limits   CULTURE, URINE   CULTURE, BLOOD   CULTURE, BLOOD   CARDIAC PANEL,(CK, CKMB & TROPONIN)   DRUG SCREEN, URINE   POC LACTIC ACID       Lab values for this patient within approximately the last 12 hours:  Recent Results (from the past 12 hour(s))   EKG, 12 LEAD, SUBSEQUENT    Collection Time: 07/25/18  3:00 AM   Result Value Ref Range    Ventricular Rate 117 BPM    Atrial Rate 117 BPM    P-R Interval 124 ms    QRS Duration 78 ms    Q-T Interval 312 ms    QTC Calculation (Bezet) 435 ms    Calculated P Axis 53 degrees    Calculated R Axis 65 degrees Calculated T Axis -28 degrees    Diagnosis       Sinus tachycardia  T wave abnormality, consider inferior ischemia  Abnormal ECG  When compared with ECG of 22-JAN-2018 17:17,  T wave inversion now evident in Inferior leads     CBC WITH AUTOMATED DIFF    Collection Time: 07/25/18  4:00 AM   Result Value Ref Range    WBC 6.5 4.6 - 13.2 K/uL    RBC 3.50 (L) 4.20 - 5.30 M/uL    HGB 10.1 (L) 12.0 - 16.0 g/dL    HCT 31.9 (L) 35.0 - 45.0 %    MCV 91.1 74.0 - 97.0 FL    MCH 28.9 24.0 - 34.0 PG    MCHC 31.7 31.0 - 37.0 g/dL    RDW 15.2 (H) 11.6 - 14.5 %    PLATELET 323 (L) 345 - 420 K/uL    MPV 10.3 9.2 - 11.8 FL    NEUTROPHILS 81 (H) 40 - 73 %    LYMPHOCYTES 11 (L) 21 - 52 %    MONOCYTES 8 3 - 10 %    EOSINOPHILS 0 0 - 5 %    BASOPHILS 0 0 - 2 %    ABS. NEUTROPHILS 5.3 1.8 - 8.0 K/UL    ABS. LYMPHOCYTES 0.7 (L) 0.9 - 3.6 K/UL    ABS. MONOCYTES 0.5 0.05 - 1.2 K/UL    ABS. EOSINOPHILS 0.0 0.0 - 0.4 K/UL    ABS. BASOPHILS 0.0 0.0 - 0.1 K/UL    DF AUTOMATED     MAGNESIUM    Collection Time: 07/25/18  4:00 AM   Result Value Ref Range    Magnesium 2.8 (H) 1.6 - 2.6 mg/dL   METABOLIC PANEL, COMPREHENSIVE    Collection Time: 07/25/18  4:00 AM   Result Value Ref Range    Sodium 151 (H) 136 - 145 mmol/L    Potassium 3.8 3.5 - 5.5 mmol/L    Chloride 115 (H) 100 - 108 mmol/L    CO2 27 21 - 32 mmol/L    Anion gap 9 3.0 - 18 mmol/L    Glucose 93 74 - 99 mg/dL    BUN 73 (H) 7.0 - 18 MG/DL    Creatinine 1.46 (H) 0.6 - 1.3 MG/DL    BUN/Creatinine ratio 50 (H) 12 - 20      GFR est AA 49 (L) >60 ml/min/1.73m2    GFR est non-AA 41 (L) >60 ml/min/1.73m2    Calcium 9.2 8.5 - 10.1 MG/DL    Bilirubin, total 3.0 (H) 0.2 - 1.0 MG/DL    ALT (SGPT) 66 (H) 13 - 56 U/L    AST (SGOT) 80 (H) 15 - 37 U/L    Alk.  phosphatase 93 45 - 117 U/L    Protein, total 9.3 (H) 6.4 - 8.2 g/dL    Albumin 3.1 (L) 3.4 - 5.0 g/dL    Globulin 6.2 (H) 2.0 - 4.0 g/dL    A-G Ratio 0.5 (L) 0.8 - 1.7     CARDIAC PANEL,(CK, CKMB & TROPONIN)    Collection Time: 07/25/18  4:00 AM Result Value Ref Range     26 - 192 U/L    CK - MB 1.6 <3.6 ng/ml    CK-MB Index 1.0 0.0 - 4.0 %    Troponin-I, Qt. 0.04 0.0 - 0.045 NG/ML   DRUG SCREEN, URINE    Collection Time: 07/25/18  4:33 AM   Result Value Ref Range    BENZODIAZEPINES NEGATIVE  NEG      BARBITURATES NEGATIVE  NEG      THC (TH-CANNABINOL) NEGATIVE  NEG      OPIATES NEGATIVE  NEG      PCP(PHENCYCLIDINE) NEGATIVE  NEG      COCAINE NEGATIVE  NEG      AMPHETAMINES NEGATIVE  NEG      METHADONE NEGATIVE  NEG      HDSCOM (NOTE)    URINALYSIS W/ RFLX MICROSCOPIC    Collection Time: 07/25/18  4:33 AM   Result Value Ref Range    Color DARK YELLOW      Appearance CLOUDY      Specific gravity 1.022 1.005 - 1.030      pH (UA) 5.0 5.0 - 8.0      Protein 100 (A) NEG mg/dL    Glucose NEGATIVE  NEG mg/dL    Ketone TRACE (A) NEG mg/dL    Bilirubin MODERATE (A) NEG      Blood SMALL (A) NEG      Urobilinogen 2.0 (H) 0.2 - 1.0 EU/dL    Nitrites POSITIVE (A) NEG      Leukocyte Esterase SMALL (A) NEG     URINE MICROSCOPIC ONLY    Collection Time: 07/25/18  4:33 AM   Result Value Ref Range    WBC 4 to 10 0 - 4 /hpf    RBC 0 to 3 0 - 5 /hpf    Epithelial cells 2+ 0 - 5 /lpf    Bacteria 4+ (A) NEG /hpf    Mucus 1+ (A) NEG /lpf    Hyaline cast 4 to 10 0 - 2 /lpf    Granular cast 4 to 10 NEG /lpf   POC LACTIC ACID    Collection Time: 07/25/18  4:41 AM   Result Value Ref Range    Lactic Acid (POC) 1.4 0.4 - 2.0 mmol/L       Radiologist and cardiologist interpretations if available at time of this note:  No results found. Interpreted by ED Physician:  Cardiac Monitor Strip interpretation is Sinus Tachycardia about 120 bpm, No ST changes noted, NORMAL WIDTH QRS. 12 lead EKG interpreted by ED Physician is Sinus Tachycardia about 120 bpm, non-specific EKG.       Medication(s) ordered for patient during this emergency visit encounter:  Medications   sodium chloride 0.9 % bolus infusion 1,000 mL (0 mL IntraVENous IV Completed 7/25/18 [id number])       Medical Decision Making     I am the first provider for this patient. I reviewed the vital signs, available nursing notes, past medical history, past surgical history, family history and social history. Vital Signs:  Reviewed the patient's vital signs. ED COURSE:    Critical Care Note:  Altered Mental Status    Critical care minutes: 35 MINUTES. Given patients presentation to ed with noted change in mental status, numerous serial neurological examinations were performed, as well as evaluation of vital signs. Given the patients underlying condition medical intervention(s) were needed, requiring numerous reevaluations of patient's vital signs and response to different emergency department therapies, total bedside time evaluating and/or treating the patient, not including procedures, is noted below. Admit to Hospitalist    The patient was presented to the accepting hospitalist, Dr. Brett White. The patient's primary doctor is Ester Corado MD, and admissions for this physician are with the hospitalist.  If the patient has no primary doctor, then admission is to the hospitalist as well. As the emergency physician, I wrote courtesy admission orders for the hospitalist physician. The courtesy orders included explicit instructions for the floor nursing staff to call the admitting attending physician upon patient arrival on the floor. Coding Diagnoses     Clinical Impression:   1. Dehydration    2. Acute cystitis without hematuria        Disposition     Disposition:  Admit    AUBREY Adame Board Certified Emergency Physician    Provider Attestation:  If a scribe was utilized in generation of this patient record, I personally performed the services described in the documentation, reviewed the documentation, as recorded by the scribe in my presence, and it accurately records the patient's history of presenting illness, review of systems, patient physical examination, and procedures performed by me as the attending physician. Lisa Dickerson M.D. [facility] Board Certified Emergency Physician  7/25/2018.  1:49 AM    Scribe Attestation     Germain Billings acting as a scribe for and in the presence of Carlos Saez MD      July 25, 2018 at 1:52 AM       Provider Attestation:      I personally performed the services described in the documentation, reviewed the documentation, as recorded by the scribe in my presence, and it accurately and completely records my words and actions. July 25, 2018 at [address] Carlos Saez MD      I received the patient  From Dr. William Dickerson at the end of hisshift . Patient  Was awaiting   Gallbladder ultrasound. Patient is nonverbal and unclear the communication that was provided by nursing home. There is a separate note that I have created for this patient to explain step so taken to ensure patient's safety and correct placement. I have discussed the case with the hospitalist but they did not look to admit the patient at this point because of unclear pathway for the patient was hospice. I did speak with Dr. Pedro Baumann was the hospice physician for this patient and he recommended patient be discharged as she is just comfort measure and no further workup and investigation is needed in this patient's care.

## 2018-07-25 NOTE — ED NOTES
[facility] and spoke to multiple staff members including \"Pepito. \" Per staff pt is verbal and \"chooses not to answer when she doesn't want to. \" Also per staff pt is on Hospice with [facility] and that they have an order to send pt to ER when heart rate is greater than 130.

## 2018-07-25 NOTE — ED NOTES
Anastasia Allen (Med ) from [facility] called to check status of pt. Pt needs to be discharged from hospice by POA.  MSW to call pts family

## 2018-07-25 NOTE — ED NOTES
[facility] and spoke with Jonna Haider RN. Per RN she was unaware pt was coming to ED and was not supposed to have any substantial interventions with care. Jonna Haider RN stating she will reach out to family to clarify desired interventions. MD aware. Per MD hold orders and medicate pt with normal saline bolus until further information is obtained.

## 2018-07-26 LAB
BACTERIA SPEC CULT: NORMAL
SERVICE CMNT-IMP: NORMAL

## 2018-07-31 LAB
BACTERIA SPEC CULT: NORMAL
SERVICE CMNT-IMP: NORMAL